# Patient Record
Sex: FEMALE | Race: BLACK OR AFRICAN AMERICAN | NOT HISPANIC OR LATINO | Employment: UNEMPLOYED | URBAN - METROPOLITAN AREA
[De-identification: names, ages, dates, MRNs, and addresses within clinical notes are randomized per-mention and may not be internally consistent; named-entity substitution may affect disease eponyms.]

---

## 2017-12-20 ENCOUNTER — TELEPHONE (OUTPATIENT)
Dept: PEDIATRICS | Facility: CLINIC | Age: 22
End: 2017-12-20

## 2017-12-20 RX ORDER — FLUCONAZOLE 150 MG/1
150 TABLET ORAL DAILY
Qty: 1 TABLET | Refills: 0 | Status: SHIPPED | OUTPATIENT
Start: 2017-12-20 | End: 2017-12-21

## 2017-12-20 NOTE — TELEPHONE ENCOUNTER
Called and notified patient medication has been sent to the pharmacy. Patient verbalized understanding.

## 2017-12-20 NOTE — TELEPHONE ENCOUNTER
----- Message from Elo Pena sent at 12/20/2017  8:33 AM CST -----  Pt at [phone]//states she is calling regarding medication//she was sent to the appt desk to give her demographics//Dr is suppose to be prescribing a medication for her//please call//meghan/frances

## 2021-07-23 ENCOUNTER — TELEPHONE (OUTPATIENT)
Dept: LACTATION | Facility: CLINIC | Age: 26
End: 2021-07-23